# Patient Record
Sex: MALE | Race: WHITE | NOT HISPANIC OR LATINO | URBAN - METROPOLITAN AREA
[De-identification: names, ages, dates, MRNs, and addresses within clinical notes are randomized per-mention and may not be internally consistent; named-entity substitution may affect disease eponyms.]

---

## 2024-01-23 NOTE — PROGRESS NOTES
Chief Complaint  Establish Care    Subjective        Oh Pretty is here to establish care  History of Present Illness  Oh is a 53-year-old male presenting today to establish care. He moved from [place] about a year ago. He is a retired disabled . He would like to get established with a psychiatrist for his anxiety and ADHD. He needs refills on his medications today. He does not take his Adderall daily. He takes it around 3 times per week. He has a history of DDD and arthralgias. He has been doing home exercises for his pain. He would like to see a physical therapist. He also has a toenail fungus on his right and left great toe. It has come and gone over the years. He has been getting pedicures and it helps some. He has several skin tags on his face that he would like to see a dermatologist for.       Previous PCP: Delonte Martinez in [place]  Specialist: none  Marital Status:   Children: 2  Occupation: retired    Exercise: not exercising right now  Diet: eats out frequently   Tobacco use: denies  Alcohol use: denies  Recreational drug use: denies    Routine Health Maintenance:  Dental: 8 months ago  Vision: overdue  Colonoscopy: 2023. In [place]. Unsure about results or follow up  PSA: will do today    Vaccines:  Shingrix: declined  Influenza: declined  COVID: declined  Tdap: 2017      The following portions of the patient's history were reviewed and updated as appropriate: allergies, current medications, past family history, past medical history, past social history, past surgical history and problem list.    Allergies   Allergen Reactions    Poultry Meal Anaphylaxis         Current Outpatient Medications:     amphetamine-dextroamphetamine (ADDERALL) 10 MG tablet, Take 1 tablet by mouth Daily., Disp: , Rfl:     sertraline (ZOLOFT) 50 MG tablet, Take 1 tablet by mouth Daily., Disp: 90 tablet, Rfl: 1    traZODone (DESYREL) 50 MG tablet, Take 1 tablet by mouth Every Night., Disp: 90 tablet,  "Rfl: 1    ciclopirox (Penlac) 8 % solution, Apply  topically to the appropriate area as directed Every Night., Disp: 6 mL, Rfl: 1    EPINEPHrine (EpiPen 2-Valentin) 0.3 MG/0.3ML solution auto-injector injection, Inject 0.3 mL into the appropriate muscle as directed by prescriber 1 (One) Time for 1 dose., Disp: 1 each, Rfl: 0    Family History   Problem Relation Age of Onset    Diabetes type II Father     Diabetes type I Maternal Grandmother     Diabetes type II Maternal Grandfather         No past medical history on file.    Social History     Socioeconomic History    Marital status:    Tobacco Use    Smoking status: Never     Passive exposure: Never    Smokeless tobacco: Never   Vaping Use    Vaping Use: Never used   Substance and Sexual Activity    Alcohol use: Never    Drug use: Never    Sexual activity: Yes        History reviewed. No pertinent surgical history.     Patient Active Problem List   Diagnosis    Anxiety    ADHD    Degenerative arthritis of left knee    DDD (degenerative disc disease), lumbar    Sciatica of left side         There is no immunization history on file for this patient.      Objective   Vital Signs:  /78 (BP Location: Left arm, Patient Position: Sitting, Cuff Size: Large Adult)   Pulse 76   Temp 98.4 °F (36.9 °C) (Temporal)   Ht 180.3 cm (71\")   Wt 107 kg (235 lb)   SpO2 97%   BMI 32.78 kg/m²   Estimated body mass index is 32.78 kg/m² as calculated from the following:    Height as of this encounter: 180.3 cm (71\").    Weight as of this encounter: 107 kg (235 lb).             Review of Systems   Constitutional:  Negative for activity change, appetite change, chills, fatigue, fever and unexpected weight change.   HENT:  Negative for congestion, rhinorrhea, sneezing and sore throat.    Eyes:  Negative for visual disturbance.   Respiratory:  Negative for cough, shortness of breath and wheezing.    Cardiovascular:  Negative for chest pain.   Gastrointestinal:  Negative for " abdominal pain, constipation, diarrhea, nausea and vomiting.   Genitourinary:  Negative for difficulty urinating and dysuria.   Musculoskeletal:  Positive for arthralgias and back pain. Negative for gait problem and myalgias.   Skin:  Negative for color change, rash and wound.   Neurological:  Negative for dizziness, weakness, light-headedness, numbness and headaches.   Psychiatric/Behavioral:  Positive for decreased concentration. Negative for self-injury, sleep disturbance and suicidal ideas. The patient is not nervous/anxious.      Physical Exam  Constitutional:       Appearance: Normal appearance.   HENT:      Head: Normocephalic and atraumatic.      Right Ear: Tympanic membrane, ear canal and external ear normal.      Left Ear: Tympanic membrane, ear canal and external ear normal.      Nose: Nose normal.      Mouth/Throat:      Mouth: Mucous membranes are moist.      Pharynx: Oropharynx is clear.   Eyes:      Extraocular Movements: Extraocular movements intact.      Conjunctiva/sclera: Conjunctivae normal.      Pupils: Pupils are equal, round, and reactive to light.   Cardiovascular:      Rate and Rhythm: Normal rate and regular rhythm.      Pulses: Normal pulses.      Heart sounds: Normal heart sounds.   Pulmonary:      Effort: Pulmonary effort is normal.      Breath sounds: Normal breath sounds.   Abdominal:      General: Abdomen is flat. Bowel sounds are normal.      Palpations: Abdomen is soft.   Musculoskeletal:         General: Normal range of motion.      Cervical back: Normal range of motion and neck supple.   Feet:      Right foot:      Toenail Condition: Fungal disease present.     Left foot:      Toenail Condition: Fungal disease present.  Skin:     General: Skin is warm and dry.      Capillary Refill: Capillary refill takes less than 2 seconds.   Neurological:      Mental Status: He is alert and oriented to person, place, and time.   Psychiatric:         Mood and Affect: Mood normal.          Behavior: Behavior normal.         Thought Content: Thought content normal.         Judgment: Judgment normal.        Result Review :                   Assessment and Plan   Diagnoses and all orders for this visit:    1. Preventative health care (Primary)  Comments:  Overall healthy 53 year old male.  Labs ordered.  F/u 6 months  Orders:  -     Comprehensive Metabolic Panel  -     CBC & Differential    2. Screening for lipid disorders  -     Lipid Panel    3. Screening for thyroid disorder  -     T4, Free  -     TSH    4. Screening for diabetes mellitus  -     Hemoglobin A1c    5. Encounter for vitamin deficiency screening  -     Vitamin D,25-Hydroxy    6. Need for hepatitis C screening test  -     Hepatitis C Antibody    7. Screening PSA (prostate specific antigen)  -     PSA Screen    8. Anxiety  Comments:  Stable.  Cont current meds  Referral to psych  Orders:  -     Ambulatory Referral to Psychiatry  -     sertraline (ZOLOFT) 50 MG tablet; Take 1 tablet by mouth Daily.  Dispense: 90 tablet; Refill: 1  -     traZODone (DESYREL) 50 MG tablet; Take 1 tablet by mouth Every Night.  Dispense: 90 tablet; Refill: 1    9. Attention deficit hyperactivity disorder (ADHD), unspecified ADHD type  Comments:  Stable.  Cont Adderall  Inspect reviewed  Will get UDS  F/u 6 mon  Orders:  -     Ambulatory Referral to Psychiatry    10. Osteoarthritis of left knee, unspecified osteoarthritis type  Comments:  Referral to PT  Cont home exercises  Orders:  -     Ambulatory Referral to Physical Therapy    11. DDD (degenerative disc disease), lumbar  Comments:  Referral to PT  Cont home exercises  Orders:  -     Ambulatory Referral to Physical Therapy    12. Sciatica of left side  Comments:  Referral to PT  Cont home exercises  Orders:  -     Ambulatory Referral to Physical Therapy    13. Skin tag  Comments:  Referral to dermatology  Orders:  -     Ambulatory Referral to Dermatology    14. Onychomycosis  Comments:  Start using  Penlac  call if no improvement  Orders:  -     ciclopirox (Penlac) 8 % solution; Apply  topically to the appropriate area as directed Every Night.  Dispense: 6 mL; Refill: 1    Other orders  -     EPINEPHrine (EpiPen 2-Valentin) 0.3 MG/0.3ML solution auto-injector injection; Inject 0.3 mL into the appropriate muscle as directed by prescriber 1 (One) Time for 1 dose.  Dispense: 1 each; Refill: 0             Follow Up   Return in about 6 months (around 7/24/2024).  Patient was given instructions and counseling regarding his condition or for health maintenance advice. Please see specific information pulled into the AVS if appropriate.

## 2024-01-24 ENCOUNTER — OFFICE VISIT (OUTPATIENT)
Dept: FAMILY MEDICINE CLINIC | Facility: CLINIC | Age: 54
End: 2024-01-24
Payer: OTHER GOVERNMENT

## 2024-01-24 VITALS
BODY MASS INDEX: 32.9 KG/M2 | HEIGHT: 71 IN | SYSTOLIC BLOOD PRESSURE: 115 MMHG | DIASTOLIC BLOOD PRESSURE: 78 MMHG | TEMPERATURE: 98.4 F | WEIGHT: 235 LBS | HEART RATE: 76 BPM | OXYGEN SATURATION: 97 %

## 2024-01-24 DIAGNOSIS — Z00.00 PREVENTATIVE HEALTH CARE: Primary | ICD-10-CM

## 2024-01-24 DIAGNOSIS — M17.12 OSTEOARTHRITIS OF LEFT KNEE, UNSPECIFIED OSTEOARTHRITIS TYPE: ICD-10-CM

## 2024-01-24 DIAGNOSIS — Z13.220 SCREENING FOR LIPID DISORDERS: ICD-10-CM

## 2024-01-24 DIAGNOSIS — Z13.1 SCREENING FOR DIABETES MELLITUS: ICD-10-CM

## 2024-01-24 DIAGNOSIS — Z13.29 SCREENING FOR THYROID DISORDER: ICD-10-CM

## 2024-01-24 DIAGNOSIS — F90.9 ATTENTION DEFICIT HYPERACTIVITY DISORDER (ADHD), UNSPECIFIED ADHD TYPE: ICD-10-CM

## 2024-01-24 DIAGNOSIS — B35.1 ONYCHOMYCOSIS: ICD-10-CM

## 2024-01-24 DIAGNOSIS — Z13.21 ENCOUNTER FOR VITAMIN DEFICIENCY SCREENING: ICD-10-CM

## 2024-01-24 DIAGNOSIS — M51.36 DDD (DEGENERATIVE DISC DISEASE), LUMBAR: ICD-10-CM

## 2024-01-24 DIAGNOSIS — Z11.59 NEED FOR HEPATITIS C SCREENING TEST: ICD-10-CM

## 2024-01-24 DIAGNOSIS — Z12.5 SCREENING PSA (PROSTATE SPECIFIC ANTIGEN): ICD-10-CM

## 2024-01-24 DIAGNOSIS — L91.8 SKIN TAG: ICD-10-CM

## 2024-01-24 DIAGNOSIS — F41.9 ANXIETY: ICD-10-CM

## 2024-01-24 DIAGNOSIS — M54.32 SCIATICA OF LEFT SIDE: ICD-10-CM

## 2024-01-24 PROBLEM — M51.369 DDD (DEGENERATIVE DISC DISEASE), LUMBAR: Status: ACTIVE | Noted: 2024-01-24

## 2024-01-24 RX ORDER — EPINEPHRINE 1 MG/ML
INJECTION, SOLUTION INTRAMUSCULAR; SUBCUTANEOUS
Status: CANCELLED | OUTPATIENT
Start: 2024-01-24

## 2024-01-24 RX ORDER — TRAZODONE HYDROCHLORIDE 50 MG/1
TABLET ORAL
COMMUNITY
Start: 2023-10-20 | End: 2024-01-24 | Stop reason: SDUPTHER

## 2024-01-24 RX ORDER — DEXTROAMPHETAMINE SACCHARATE, AMPHETAMINE ASPARTATE, DEXTROAMPHETAMINE SULFATE AND AMPHETAMINE SULFATE 2.5; 2.5; 2.5; 2.5 MG/1; MG/1; MG/1; MG/1
10 TABLET ORAL DAILY
COMMUNITY

## 2024-01-24 RX ORDER — TRAZODONE HYDROCHLORIDE 50 MG/1
50 TABLET ORAL NIGHTLY
Qty: 90 TABLET | Refills: 1 | Status: SHIPPED | OUTPATIENT
Start: 2024-01-24

## 2024-01-24 RX ORDER — EPINEPHRINE 0.3 MG/.3ML
0.3 INJECTION SUBCUTANEOUS ONCE
Qty: 1 EACH | Refills: 0 | Status: SHIPPED | OUTPATIENT
Start: 2024-01-24 | End: 2024-01-24

## 2024-01-26 ENCOUNTER — LAB (OUTPATIENT)
Dept: FAMILY MEDICINE CLINIC | Facility: CLINIC | Age: 54
End: 2024-01-26
Payer: OTHER GOVERNMENT

## 2024-01-26 LAB
25(OH)D3 SERPL-MCNC: 17.5 NG/ML (ref 30–100)
ALBUMIN SERPL-MCNC: 4.6 G/DL (ref 3.5–5.2)
ALBUMIN/GLOB SERPL: 1.9 G/DL
ALP SERPL-CCNC: 74 U/L (ref 39–117)
ALT SERPL W P-5'-P-CCNC: 37 U/L (ref 1–41)
ANION GAP SERPL CALCULATED.3IONS-SCNC: 9 MMOL/L (ref 5–15)
AST SERPL-CCNC: 23 U/L (ref 1–40)
BASOPHILS # BLD AUTO: 0.05 10*3/MM3 (ref 0–0.2)
BASOPHILS NFR BLD AUTO: 0.6 % (ref 0–1.5)
BILIRUB SERPL-MCNC: 0.6 MG/DL (ref 0–1.2)
BUN SERPL-MCNC: 9 MG/DL (ref 6–20)
BUN/CREAT SERPL: 8.6 (ref 7–25)
CALCIUM SPEC-SCNC: 9.4 MG/DL (ref 8.6–10.5)
CHLORIDE SERPL-SCNC: 104 MMOL/L (ref 98–107)
CHOLEST SERPL-MCNC: 224 MG/DL (ref 0–200)
CO2 SERPL-SCNC: 27 MMOL/L (ref 22–29)
CREAT SERPL-MCNC: 1.05 MG/DL (ref 0.76–1.27)
DEPRECATED RDW RBC AUTO: 42.1 FL (ref 37–54)
EGFRCR SERPLBLD CKD-EPI 2021: 84.4 ML/MIN/1.73
EOSINOPHIL # BLD AUTO: 0.25 10*3/MM3 (ref 0–0.4)
EOSINOPHIL NFR BLD AUTO: 3 % (ref 0.3–6.2)
ERYTHROCYTE [DISTWIDTH] IN BLOOD BY AUTOMATED COUNT: 13 % (ref 12.3–15.4)
GLOBULIN UR ELPH-MCNC: 2.4 GM/DL
GLUCOSE SERPL-MCNC: 86 MG/DL (ref 65–99)
HBA1C MFR BLD: 5.9 % (ref 4.8–5.6)
HCT VFR BLD AUTO: 47.2 % (ref 37.5–51)
HCV AB SER DONR QL: NORMAL
HDLC SERPL-MCNC: 39 MG/DL (ref 40–60)
HGB BLD-MCNC: 16.1 G/DL (ref 13–17.7)
IMM GRANULOCYTES # BLD AUTO: 0.17 10*3/MM3 (ref 0–0.05)
IMM GRANULOCYTES NFR BLD AUTO: 2 % (ref 0–0.5)
LDLC SERPL CALC-MCNC: 138 MG/DL (ref 0–100)
LDLC/HDLC SERPL: 3.42 {RATIO}
LYMPHOCYTES # BLD AUTO: 2.19 10*3/MM3 (ref 0.7–3.1)
LYMPHOCYTES NFR BLD AUTO: 26 % (ref 19.6–45.3)
MCH RBC QN AUTO: 30.7 PG (ref 26.6–33)
MCHC RBC AUTO-ENTMCNC: 34.1 G/DL (ref 31.5–35.7)
MCV RBC AUTO: 89.9 FL (ref 79–97)
MONOCYTES # BLD AUTO: 0.5 10*3/MM3 (ref 0.1–0.9)
MONOCYTES NFR BLD AUTO: 5.9 % (ref 5–12)
NEUTROPHILS NFR BLD AUTO: 5.27 10*3/MM3 (ref 1.7–7)
NEUTROPHILS NFR BLD AUTO: 62.5 % (ref 42.7–76)
NRBC BLD AUTO-RTO: 0 /100 WBC (ref 0–0.2)
PLATELET # BLD AUTO: 245 10*3/MM3 (ref 140–450)
PMV BLD AUTO: 11.3 FL (ref 6–12)
POTASSIUM SERPL-SCNC: 4.3 MMOL/L (ref 3.5–5.2)
PROT SERPL-MCNC: 7 G/DL (ref 6–8.5)
PSA SERPL-MCNC: 1.11 NG/ML (ref 0–4)
RBC # BLD AUTO: 5.25 10*6/MM3 (ref 4.14–5.8)
SODIUM SERPL-SCNC: 140 MMOL/L (ref 136–145)
T4 FREE SERPL-MCNC: 0.98 NG/DL (ref 0.93–1.7)
TRIGL SERPL-MCNC: 259 MG/DL (ref 0–150)
TSH SERPL DL<=0.05 MIU/L-ACNC: 1.57 UIU/ML (ref 0.27–4.2)
VLDLC SERPL-MCNC: 47 MG/DL (ref 5–40)
WBC NRBC COR # BLD AUTO: 8.43 10*3/MM3 (ref 3.4–10.8)

## 2024-01-26 PROCEDURE — 80053 COMPREHEN METABOLIC PANEL: CPT | Performed by: NURSE PRACTITIONER

## 2024-01-26 PROCEDURE — 82306 VITAMIN D 25 HYDROXY: CPT | Performed by: NURSE PRACTITIONER

## 2024-01-26 PROCEDURE — G0103 PSA SCREENING: HCPCS | Performed by: NURSE PRACTITIONER

## 2024-01-26 PROCEDURE — 86803 HEPATITIS C AB TEST: CPT | Performed by: NURSE PRACTITIONER

## 2024-01-26 PROCEDURE — 85025 COMPLETE CBC W/AUTO DIFF WBC: CPT | Performed by: NURSE PRACTITIONER

## 2024-01-26 PROCEDURE — 83036 HEMOGLOBIN GLYCOSYLATED A1C: CPT | Performed by: NURSE PRACTITIONER

## 2024-01-26 PROCEDURE — 36415 COLL VENOUS BLD VENIPUNCTURE: CPT | Performed by: NURSE PRACTITIONER

## 2024-01-26 PROCEDURE — 84443 ASSAY THYROID STIM HORMONE: CPT | Performed by: NURSE PRACTITIONER

## 2024-01-26 PROCEDURE — 80061 LIPID PANEL: CPT | Performed by: NURSE PRACTITIONER

## 2024-01-26 PROCEDURE — 84439 ASSAY OF FREE THYROXINE: CPT | Performed by: NURSE PRACTITIONER

## 2024-02-16 ENCOUNTER — OFFICE VISIT (OUTPATIENT)
Dept: FAMILY MEDICINE CLINIC | Facility: CLINIC | Age: 54
End: 2024-02-16
Payer: OTHER GOVERNMENT

## 2024-02-16 VITALS
SYSTOLIC BLOOD PRESSURE: 126 MMHG | OXYGEN SATURATION: 97 % | TEMPERATURE: 97.5 F | BODY MASS INDEX: 32.48 KG/M2 | HEIGHT: 71 IN | DIASTOLIC BLOOD PRESSURE: 82 MMHG | HEART RATE: 65 BPM | WEIGHT: 232 LBS

## 2024-02-16 DIAGNOSIS — R03.0 ELEVATED BLOOD PRESSURE READING: ICD-10-CM

## 2024-02-16 DIAGNOSIS — H93.11 TINNITUS OF RIGHT EAR: Primary | ICD-10-CM

## 2024-02-16 PROCEDURE — 99213 OFFICE O/P EST LOW 20 MIN: CPT | Performed by: NURSE PRACTITIONER

## 2024-03-19 NOTE — PROGRESS NOTES
[facility]   [address], [address]  [place] [address]  [phone]  Leslie Power, MSN, APRN, PMHNP-BC    NAME: Oh Pretty     : 1970   MRN: [id number]     Patient Care Team:  Odalys Dean APRN as PCP - General (Nurse Practitioner)    DATE: 2024    -Patient was referred by: [] SELF  [x] Alevism provider: JORDYN Campos  -Psychiatric history packet turned in/reviewed : [x] Yes [] No    Subjective     CHIEF COMPLAINT: anxiety, ADHD    HPI:  Oh Pretty, a 53 y.o. male patient seen for the first time today for initial evaluation.    Moved from [place] about a year ago. Retired, disabled [place].  He moved in April of last year, but has been going back and forth to get his medications.   He is on Adderall 10mg. He thinks it was the inattentive form. Diagnosed in the .   He is on sertraline for anxiety and trazodone for sleep.  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 States he becomes anxious when he has to go there. Also develops panic when he is in some social situations. One example he gave was going to [employer]. He reports being on alprazolam in the past for this. He would like to go back on it again.    He has been on the sertraline since .   Never hospitalized for depression or anxiety.   Panic symptoms: anxiety, tingly hands, irritability.   Denies any suicidal thoughts.   He has 2 boys, they are grown. He lives with his wife.   His parents are here. They moved to be near to family.   He is having difficulty sleeping. Takes the trazodone for this. He states it helps for a time, and then stops working. He has to take a couple days off, and then it will work again.    Denies any symptoms of collin.     Family Psychiatric History:   Mother--anxiety     SYMPTOMS:      MOOD: happy     SLEEP: average      ENERGY: average      CONCENTRATION/FOCUS: average      APPETITE: average     PRIOR PSYCH MEDICATIONS:  Sertraline  "  Trazodone   Adderall   Alrazolam   Clonazepam     PRIOR PSYCH DX:   Depression   Anxiety     Medical history:   None      SUBSTANCE USE:   Nicotine/Tobacco: non-smoker  Alcohol: once a month  Illicit drugs: Denies   Cannabis/Marijuana: Denies   Caffeine: he has coffee in the morning.   Vaping: denies    OTC: denies     SEIZURE HISTORY: No    Patient presents with symptoms and behaviors that are consistent with the following DSM-5 diagnoses:  Generalized anxiety disorder  2. Panic disorder  3. ADHD    Ability and capacity to respond to treatment: good  Functional status: good  Prognosis: good  Long term goals: improve anxiety and overall quality of life.   Short term goals:reduce number of panic attacks.  and improve focus and concentration.     Objective     /80   Pulse 67   Ht 180.3 cm (71\")   Wt 106 kg (234 lb 9.6 oz)   SpO2 97%   BMI 32.72 kg/m²   No LMP for male patient.    Social History     Occupational History    Not on file   Tobacco Use    Smoking status: Never     Passive exposure: Never    Smokeless tobacco: Never   Vaping Use    Vaping status: Never Used   Substance and Sexual Activity    Alcohol use: Never    Drug use: Never    Sexual activity: Yes     Family History   Problem Relation Age of Onset    Diabetes type II Father     Diabetes type I Maternal Grandmother     Diabetes type II Maternal Grandfather       History reviewed. No pertinent past medical history.  History reviewed. No pertinent surgical history.   Review of Systems     The following portions of the patient's history were reviewed and updated as appropriate: allergies, current medications, past family history, past medical history, past social history, past surgical history and problem list.      Allergy:   Allergies   Allergen Reactions    Poultry Meal Anaphylaxis        Discontinued Medications:  Medications Discontinued During This Encounter   Medication Reason    amphetamine-dextroamphetamine (ADDERALL) 10 MG tablet " Reorder       Current Medications:   Current Outpatient Medications   Medication Sig Dispense Refill    amphetamine-dextroamphetamine (ADDERALL) 10 MG tablet Take 1 tablet by mouth Daily. 30 tablet 0    ciclopirox (Penlac) 8 % solution Apply  topically to the appropriate area as directed Every Night. 6 mL 1    sertraline (ZOLOFT) 50 MG tablet Take 1 tablet by mouth Daily. 90 tablet 1    traZODone (DESYREL) 50 MG tablet Take 1 tablet by mouth Every Night. 90 tablet 1    ALPRAZolam (XANAX) 0.5 MG tablet Take 1 tablet by mouth As Needed (panic). 15 tablet 1     No current facility-administered medications for this visit.         Psychological ROS: positive for - anxiety and concentration difficulties  negative for - behavioral disorder, decreased libido, disorientation, hallucinations, hostility, irritability, memory difficulties, mood swings, obsessive thoughts, physical abuse, sexual abuse, or suicidal ideation    Mental Status Exam:   Hygiene:   good  Cooperation:  Cooperative  Eye Contact:  Good  Psychomotor Behavior:  Appropriate  Affect:  Appropriate  Mood: anxious  Hopelessness: Denies  Speech:  Normal  Thought Process:  Goal directed and Linear  Thought Content:  Normal and Mood congruent  Suicidal:  None  Homicidal:  None  Hallucinations:  None  Delusion:  None  Memory:  Intact  Orientation:  Person, Place, Time, and Situation  Reliability:  good  Insight:  Good  Judgement:  Good  Impulse Control:  Good  Physical/Medical Issues:  No      PHQ-9 Depression Screening    Little interest or pleasure in doing things? 0-->not at all   Feeling down, depressed, or hopeless? 0-->not at all   Trouble falling or staying asleep, or sleeping too much? 1-->several days   Feeling tired or having little energy? 1-->several days   Poor appetite or overeating? 0-->not at all   Feeling bad about yourself - or that you are a failure or have let yourself or your family down? 0-->not at all   Trouble concentrating on things, such as  reading the newspaper or watching television? 0-->not at all   Moving or speaking so slowly that other people could have noticed? Or the opposite - being so fidgety or restless that you have been moving around a lot more than usual? 0-->not at all   Thoughts that you would be better off dead, or of hurting yourself in some way? 0-->not at all   PHQ-9 Total Score 2   If you checked off any problems, how difficult have these problems made it for you to do your work, take care of things at home, or get along with other people? somewhat difficult        GAD7 Documentation:  Feeling nervous, anxious or on edge 2   Not being able to stop or control worrying 1   Worrying too much about different things 1   Trouble relaxing 2   Being so restless that it is hard to sit still 1   Becoming easily annoyed or irritable 1   Feeling Afraid as if something awful might happen 0   ANETTE Total Score 8   How difficult have these problems made it for you? Somewhat difficult     Never smoker    I advised Oh of the risks of tobacco use.     Result Review:    Labs:  Office Visit on 01/24/2024   Component Date Value Ref Range Status    Glucose 01/26/2024 86  65 - 99 mg/dL Final    BUN 01/26/2024 9  6 - 20 mg/dL Final    Creatinine 01/26/2024 1.05  0.76 - 1.27 mg/dL Final    Sodium 01/26/2024 140  136 - 145 mmol/L Final    Potassium 01/26/2024 4.3  3.5 - 5.2 mmol/L Final    Chloride 01/26/2024 104  98 - 107 mmol/L Final    CO2 01/26/2024 27.0  22.0 - 29.0 mmol/L Final    Calcium 01/26/2024 9.4  8.6 - 10.5 mg/dL Final    Total Protein 01/26/2024 7.0  6.0 - 8.5 g/dL Final    Albumin 01/26/2024 4.6  3.5 - 5.2 g/dL Final    ALT (SGPT) 01/26/2024 37  1 - 41 U/L Final    AST (SGOT) 01/26/2024 23  1 - 40 U/L Final    Alkaline Phosphatase 01/26/2024 74  39 - 117 U/L Final    Total Bilirubin 01/26/2024 0.6  0.0 - 1.2 mg/dL Final    Globulin 01/26/2024 2.4  gm/dL Final    A/G Ratio 01/26/2024 1.9  g/dL Final    BUN/Creatinine Ratio 01/26/2024 8.6  7.0  - 25.0 Final    Anion Gap 01/26/2024 9.0  5.0 - 15.0 mmol/L Final    eGFR 01/26/2024 84.4  >60.0 mL/min/1.73 Final    Hemoglobin A1C 01/26/2024 5.90 (H)  4.80 - 5.60 % Final    Total Cholesterol 01/26/2024 224 (H)  0 - 200 mg/dL Final    Triglycerides 01/26/2024 259 (H)  0 - 150 mg/dL Final    HDL Cholesterol 01/26/2024 39 (L)  40 - 60 mg/dL Final    LDL Cholesterol  01/26/2024 138 (H)  0 - 100 mg/dL Final    VLDL Cholesterol 01/26/2024 47 (H)  5 - 40 mg/dL Final    LDL/HDL Ratio 01/26/2024 3.42   Final    Free T4 01/26/2024 0.98  0.93 - 1.70 ng/dL Final    TSH 01/26/2024 1.570  0.270 - 4.200 uIU/mL Final    25 Hydroxy, Vitamin D 01/26/2024 17.5 (L)  30.0 - 100.0 ng/ml Final    Hepatitis C Ab 01/26/2024 Non-Reactive  Non-Reactive Final    PSA 01/26/2024 1.110  0.000 - 4.000 ng/mL Final    WBC 01/26/2024 8.43  3.40 - 10.80 10*3/mm3 Final    RBC 01/26/2024 5.25  4.14 - 5.80 10*6/mm3 Final    Hemoglobin 01/26/2024 16.1  13.0 - 17.7 g/dL Final    Hematocrit 01/26/2024 47.2  37.5 - 51.0 % Final    MCV 01/26/2024 89.9  79.0 - 97.0 fL Final    MCH 01/26/2024 30.7  26.6 - 33.0 pg Final    MCHC 01/26/2024 34.1  31.5 - 35.7 g/dL Final    RDW 01/26/2024 13.0  12.3 - 15.4 % Final    RDW-SD 01/26/2024 42.1  37.0 - 54.0 fl Final    MPV 01/26/2024 11.3  6.0 - 12.0 fL Final    Platelets 01/26/2024 245  140 - 450 10*3/mm3 Final    Neutrophil % 01/26/2024 62.5  42.7 - 76.0 % Final    Lymphocyte % 01/26/2024 26.0  19.6 - 45.3 % Final    Monocyte % 01/26/2024 5.9  5.0 - 12.0 % Final    Eosinophil % 01/26/2024 3.0  0.3 - 6.2 % Final    Basophil % 01/26/2024 0.6  0.0 - 1.5 % Final    Immature Grans % 01/26/2024 2.0 (H)  0.0 - 0.5 % Final    Neutrophils, Absolute 01/26/2024 5.27  1.70 - 7.00 10*3/mm3 Final    Lymphocytes, Absolute 01/26/2024 2.19  0.70 - 3.10 10*3/mm3 Final    Monocytes, Absolute 01/26/2024 0.50  0.10 - 0.90 10*3/mm3 Final    Eosinophils, Absolute 01/26/2024 0.25  0.00 - 0.40 10*3/mm3 Final    Basophils, Absolute  01/26/2024 0.05  0.00 - 0.20 10*3/mm3 Final    Immature Grans, Absolute 01/26/2024 0.17 (H)  0.00 - 0.05 10*3/mm3 Final    nRBC 01/26/2024 0.0  0.0 - 0.2 /100 WBC Final       Assessment & Plan   Diagnoses and all orders for this visit:    1. ADHD, predominantly inattentive type (Primary)  -     amphetamine-dextroamphetamine (ADDERALL) 10 MG tablet; Take 1 tablet by mouth Daily.  Dispense: 30 tablet; Refill: 0  -     [facility] Full Urine Drug Screen -; Future    2. Generalized anxiety disorder  -     ALPRAZolam (XANAX) 0.5 MG tablet; Take 1 tablet by mouth As Needed (panic).  Dispense: 15 tablet; Refill: 1    3. Panic disorder  -     ALPRAZolam (XANAX) 0.5 MG tablet; Take 1 tablet by mouth As Needed (panic).  Dispense: 15 tablet; Refill: 1  -     [facility] Full Urine Drug Screen -; Future       Continue sertraline 50mg daily.   Continue trazodone 50mg nightly.   Continue Adderall 10mg daily.   Start back on alprazolam 0.5mg PRN panic.     UDS completed.     Visit Diagnoses:    ICD-10-CM ICD-9-CM   1. ADHD, predominantly inattentive type  F90.0 314.00   2. Generalized anxiety disorder  F41.1 300.02   3. Panic disorder  F41.0 300.01       Pt history, review of systems, medications, allergies, reviewed, patient was screened today for depression/anxiety, PHQ/ANETTE scores reviewed.  Most recent vitals/labs reviewed.  Pt was given appropriate time to ask questions and concerns were addressed. A thorough discussion was had that included review of disease process, need for continued monitoring and additional treatment options including use of pharmacological and non-pharmacological approaches to care, decisions were made and agreed upon by patient and provider.   Discussed the risks, benefits, and potential side effects of the medications; patient ackowledged and verbally consented.     TREATMENT PLAN/GOALS: Continue supportive psychotherapy efforts and medications as indicated. Treatment and medication options discussed during  today's visit. Patient ackowledged and verbally consented to continue with current treatment plan and was educated on the importance of compliance with treatment and follow-up appointments.    -Short-Term Goals: Patient will be compliant with medication management and note improvement in S/S over the next 4 to 6 weeks or at next scheduled visit.  -Long-Term Goals: Patient will be compliant with the agreed treatment plan including medication regimen & F/U appt's and deny impairment in daily functioning over the next 6 months.      CRISIS RESOURCES:    In the event you have personal crisis, there are several resources to reach someone to talk with:    988 Suicide and Crisis Lifeline  Call or text 754 or chat 98Junarline.org  [facility]'s Get Me Listed Helpline  7-111-166-HELP (4357)  Text your zip code to 561647 (HELP4U)  's Crisis Line  Dial 200, then press 1  Text 303733    No show policy:  We understand unexpected circumstances arise; however, anytime you miss your appointment we are unable to provide you appropriate care.  In addition, each appointment missed could have been used to provide care for others.  We ask that you call at least 24 hours in advance to cancel or reschedule an appointment. We would like to take this opportunity to remind you of our policy stating patients who miss THREE appointments without cancelling or rescheduling 24 hours in advance of the appointment may be subject to cancellation of any further visits with our clinic. Please call [phone] to reschedule your appointment. If there are reasons that make it difficult for you to keep the appointments, please call and let us know how we can help. Please understand that medication prescribing will not continue without seeing your provider.        MEDICATION ISSUES:  INSPECT reviewed as expected    Discussed medication options and treatment plan of prescribed medication as well as the risks, benefits, and side effects including potential  falls, possible impaired driving and metabolic adversities among others. Patient is agreeable to call the office with any worsening of symptoms or onset of side effects. Patient is agreeable to call 911 or go to the nearest ER should he/she begin having SI/HI. No medication side effects or related complaints today.     MEDS ORDERED DURING VISIT:  New Medications Ordered This Visit   Medications    amphetamine-dextroamphetamine (ADDERALL) 10 MG tablet     Sig: Take 1 tablet by mouth Daily.     Dispense:  30 tablet     Refill:  0    ALPRAZolam (XANAX) 0.5 MG tablet     Sig: Take 1 tablet by mouth As Needed (panic).     Dispense:  15 tablet     Refill:  1     15 tablets for 30 day supply.       Return in about 3 months (around 6/20/2024).         This document has been electronically signed by JORDYN Gavin  March 20, 2024 09:29 EDT    Part of this note may be an electronic transcription/translation of spoken language to printed text using the Dragon Dictation System. Some of the data in this electronic note has been brought forward from a previous encounter, any necessary changes have been made, it has been reviewed by this APRN, and it is accurate.

## 2024-03-20 ENCOUNTER — OFFICE VISIT (OUTPATIENT)
Dept: PSYCHIATRY | Facility: CLINIC | Age: 54
End: 2024-03-20
Payer: OTHER GOVERNMENT

## 2024-03-20 ENCOUNTER — PATIENT ROUNDING (BHMG ONLY) (OUTPATIENT)
Dept: PSYCHIATRY | Facility: CLINIC | Age: 54
End: 2024-03-20
Payer: OTHER GOVERNMENT

## 2024-03-20 VITALS
WEIGHT: 234.6 LBS | HEART RATE: 67 BPM | HEIGHT: 71 IN | DIASTOLIC BLOOD PRESSURE: 80 MMHG | SYSTOLIC BLOOD PRESSURE: 118 MMHG | BODY MASS INDEX: 32.84 KG/M2 | OXYGEN SATURATION: 97 %

## 2024-03-20 DIAGNOSIS — F90.0 ADHD, PREDOMINANTLY INATTENTIVE TYPE: Chronic | ICD-10-CM

## 2024-03-20 DIAGNOSIS — F41.1 GENERALIZED ANXIETY DISORDER: Primary | Chronic | ICD-10-CM

## 2024-03-20 DIAGNOSIS — F41.0 PANIC DISORDER: Chronic | ICD-10-CM

## 2024-03-20 RX ORDER — DEXTROAMPHETAMINE SACCHARATE, AMPHETAMINE ASPARTATE, DEXTROAMPHETAMINE SULFATE AND AMPHETAMINE SULFATE 2.5; 2.5; 2.5; 2.5 MG/1; MG/1; MG/1; MG/1
10 TABLET ORAL DAILY
Qty: 30 TABLET | Refills: 0 | Status: SHIPPED | OUTPATIENT
Start: 2024-03-20

## 2024-03-20 RX ORDER — ALPRAZOLAM 0.5 MG/1
0.5 TABLET ORAL AS NEEDED
Qty: 15 TABLET | Refills: 1 | Status: SHIPPED | OUTPATIENT
Start: 2024-03-20

## 2024-03-20 NOTE — PROGRESS NOTES
A My-chart message has been sent to the patient for PATIENT ROUNDING with [facility].

## 2024-03-29 ENCOUNTER — TELEPHONE (OUTPATIENT)
Dept: PSYCHIATRY | Facility: CLINIC | Age: 54
End: 2024-03-29
Payer: OTHER GOVERNMENT

## 2024-03-29 NOTE — TELEPHONE ENCOUNTER
----- Message from JORDYN Neal sent at 3/29/2024 11:06 AM EDT -----  This patient was positive for alprazolam. He stated this was prescribed to him before, but he was out at the time. I was not able to review controlled substances for him because he stated he had been getting them from [place], so we either need to find out how we can view [place]'s controlled substances, or he is going to get a release for records for where the alprazolam was prescribed. Otherwise, it is considered an illicit drug screen.  ----- Message -----  From: Maia Mishra Incoming  Sent: 3/26/2024   2:54 PM EDT  To: JORDYN Neal

## 2024-03-29 NOTE — TELEPHONE ENCOUNTER
I googled his previous provider in [facility].The info was in media for medical records request. I will call them to see if they will fax me last OV or their last med check (Inspect) to us for continued care.    [address] [address]  [phone]   [phone] fax

## 2024-05-24 DIAGNOSIS — F90.0 ADHD, PREDOMINANTLY INATTENTIVE TYPE: Chronic | ICD-10-CM

## 2024-05-24 DIAGNOSIS — F41.9 ANXIETY: ICD-10-CM

## 2024-05-24 RX ORDER — TRIAMCINOLONE ACETONIDE 55 UG/1
SPRAY, METERED NASAL
COMMUNITY
Start: 2024-05-09

## 2024-05-24 RX ORDER — TRAZODONE HYDROCHLORIDE 50 MG/1
50 TABLET ORAL NIGHTLY
Qty: 90 TABLET | Refills: 1 | Status: SHIPPED | OUTPATIENT
Start: 2024-05-24

## 2024-05-24 RX ORDER — DEXTROAMPHETAMINE SACCHARATE, AMPHETAMINE ASPARTATE, DEXTROAMPHETAMINE SULFATE AND AMPHETAMINE SULFATE 2.5; 2.5; 2.5; 2.5 MG/1; MG/1; MG/1; MG/1
10 TABLET ORAL DAILY
Qty: 30 TABLET | Refills: 0 | Status: SHIPPED | OUTPATIENT
Start: 2024-05-24

## 2024-10-11 ENCOUNTER — TELEPHONE (OUTPATIENT)
Dept: FAMILY MEDICINE CLINIC | Facility: CLINIC | Age: 54
End: 2024-10-11

## 2024-10-11 DIAGNOSIS — F90.0 ADHD, PREDOMINANTLY INATTENTIVE TYPE: Chronic | ICD-10-CM

## 2024-10-11 RX ORDER — DEXTROAMPHETAMINE SACCHARATE, AMPHETAMINE ASPARTATE, DEXTROAMPHETAMINE SULFATE AND AMPHETAMINE SULFATE 2.5; 2.5; 2.5; 2.5 MG/1; MG/1; MG/1; MG/1
10 TABLET ORAL DAILY
Qty: 30 TABLET | Refills: 0 | OUTPATIENT
Start: 2024-10-11

## 2024-10-11 RX ORDER — EPINEPHRINE 0.3 MG/.3ML
0.3 INJECTION SUBCUTANEOUS ONCE
Qty: 1 EACH | Refills: 0 | Status: SHIPPED | OUTPATIENT
Start: 2024-10-11 | End: 2024-10-11

## 2024-10-11 NOTE — TELEPHONE ENCOUNTER
Rx Refill Note  Requested Prescriptions     Pending Prescriptions Disp Refills    amphetamine-dextroamphetamine (ADDERALL) 10 MG tablet 30 tablet 0     Sig: Take 1 tablet by mouth Daily.      Last office visit with prescribing clinician: 3/20/2024   Last telemedicine visit with prescribing clinician: Visit date not found   Next office visit with prescribing clinician: PT TRANSFERRED TO MAKE APPT, BUT THEN OBED SAID HE HUNG UP; I CALLED HIM BACK AND HE DID NOT ANSWER, SO I DON'T KNOW IF HE WILL CALL BACK TO MAKE APPT, BUT HE SAID HE WAS GOING OUT OF TOWN AND WON'T BE BACK UNTIL AFTER NOVEMBER, SO I TOLD HIM TO SCHEDULE THE APPT FOR AFTER NOVEMBER.  Office Visit with Leslie Power APRN (03/20/2024)   [facility] Full Urine Drug Screen - (03/20/2024)                     Would you like a call back once the refill request has been completed: [] Yes [] No    If the office needs to give you a call back, can they leave a voicemail: [] Yes [] No    Humaira Jacome MA  10/11/24, 13:17 EDT

## 2024-10-11 NOTE — TELEPHONE ENCOUNTER
Caller: AMBER GOLDSMITH    Relationship: Spouse    Best call back number:     What medication are you requesting:   EPINEPHrine (EpiPen 2-Valentin) 0.3 MG/0.3ML solution auto-injector injection    What are your current symptoms:     How long have you been experiencing symptoms:     Have you had these symptoms before:    [x] Yes  [] No    Have you been treated for these symptoms before:   [x] Yes  [] No    If a prescription is needed, what is your preferred pharmacy and phone number: [facility] [id number] [address] [phone] [facility] [phone]      Additional notes:

## 2024-11-16 DIAGNOSIS — F41.9 ANXIETY: ICD-10-CM

## 2024-11-18 RX ORDER — TRAZODONE HYDROCHLORIDE 50 MG/1
50 TABLET, FILM COATED ORAL
Qty: 90 TABLET | Refills: 1 | Status: SHIPPED | OUTPATIENT
Start: 2024-11-18

## 2024-12-06 DIAGNOSIS — F90.0 ADHD, PREDOMINANTLY INATTENTIVE TYPE: Chronic | ICD-10-CM

## 2024-12-06 RX ORDER — DEXTROAMPHETAMINE SACCHARATE, AMPHETAMINE ASPARTATE, DEXTROAMPHETAMINE SULFATE AND AMPHETAMINE SULFATE 2.5; 2.5; 2.5; 2.5 MG/1; MG/1; MG/1; MG/1
10 TABLET ORAL DAILY
Qty: 30 TABLET | Refills: 0 | OUTPATIENT
Start: 2024-12-06

## 2024-12-06 NOTE — TELEPHONE ENCOUNTER
Rx Refill Note  Requested Prescriptions     Pending Prescriptions Disp Refills    amphetamine-dextroamphetamine (ADDERALL) 10 MG tablet 30 tablet 0     Sig: Take 1 tablet by mouth Daily.        Last office visit with prescribing clinician: 3/20/2024     Next office visit with prescribing clinician: 2/3/2025     Office Visit with Leslie Power APRN (03/20/2024)     [facility] Full Urine Drug Screen - (03/20/2024)     BEHAVIORAL HEALTH - SCAN - Inspect Report, EDWARD power, 12/6/24 (12/06/2024)     Inspect Fill 5/29/24    Nancy Hisrch  12/06/24, 09:15 EST

## 2024-12-06 NOTE — PROGRESS NOTES
[facility]   [address], [address]  [place] [address]  [phone]  Leslie Power, MSN, APRN, PMHNP-BC    NAME: Oh Pretty     : 1970   MRN: [id number]     Patient Care Team:  Odalys Dean APRN as PCP - General (Nurse Practitioner)    DATE: 2024    -Patient was referred by: [] SELF  [x] Sikhism provider: JORDYN Campos  -Psychiatric history packet turned in/reviewed : [x] Yes [] No    Subjective     CHIEF COMPLAINT: anxiety, ADHD    HPI:  Oh Pretty, a 54 y.o. male patient seen for follow up for psychiatric medication management.     24: Patient here for follow up. He missed his appointment in  due to being in [place]. He states they still have a home there, and his son lives there, and they go back and forth. His son works for the fire department there and they go to visit to help with his kids, etc. He has been taking sertraline and trazodone, and states they are working well. His PCP last refilled them.   Denies any suicidal thoughts.    He reports the Adderall was working well when he was taking it, but he has been out since September. He was waiting for his appointment here.     Will refill Adderall 10mg daily. Patient advised to call if pharmacy is out of stock.     Denies any SI/HI/AVH.     Will plan to follow up in 3 months. Patient reminded that he has to keep his follow up appointments to continue to get refills on controlled medications. He expressed understanding.     3/20/24: Moved from [place] about a year ago. Retired, disabled [place].  He moved in April of last year, but has been going back and forth to get his medications.   He is on Adderall 10mg. He thinks it was the inattentive form. Diagnosed in the .   He is on sertraline for anxiety and trazodone for sleep.  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 States he becomes anxious when he has to go there. Also develops panic when he is in some  social situations. One example he gave was going to [place]. He reports being on alprazolam in the past for this. He would like to go back on it again.    He has been on the sertraline since 2010.   Never hospitalized for depression or anxiety.   Panic symptoms: anxiety, tingly hands, irritability.   Denies any suicidal thoughts.   He has 2 boys, they are grown. He lives with his wife.   His parents are here. They moved to be near to family.   He is having difficulty sleeping. Takes the trazodone for this. He states it helps for a time, and then stops working. He has to take a couple days off, and then it will work again.    Denies any symptoms of collin.     Family Psychiatric History:   Mother--anxiety     PRIOR PSYCH MEDICATIONS:  Sertraline   Trazodone   Adderall   Alrazolam   Clonazepam     PRIOR PSYCH DX:   Depression   Anxiety     Medical history:   None      SUBSTANCE USE:   Nicotine/Tobacco: non-smoker  Alcohol: once a month  Illicit drugs: Denies   Cannabis/Marijuana: Denies   Caffeine: he has coffee in the morning.   Vaping: denies    OTC: denies     SEIZURE HISTORY: No    Patient presents with symptoms and behaviors that are consistent with the following DSM-5 diagnoses:  Generalized anxiety disorder  2. Panic disorder  3. ADHD    Objective     There were no vitals taken for this visit.  No LMP for male patient.    Social History     Occupational History    Not on file   Tobacco Use    Smoking status: Never     Passive exposure: Never    Smokeless tobacco: Never   Vaping Use    Vaping status: Never Used   Substance and Sexual Activity    Alcohol use: Never    Drug use: Never    Sexual activity: Yes     Family History   Problem Relation Age of Onset    Diabetes type II Father     Diabetes type I Maternal Grandmother     Diabetes type II Maternal Grandfather       History reviewed. No pertinent past medical history.  History reviewed. No pertinent surgical history.   Review of Systems     The following  portions of the patient's history were reviewed and updated as appropriate: allergies, current medications, past family history, past medical history, past social history, past surgical history and problem list.      Allergy:   Allergies   Allergen Reactions    Poultry Meal Anaphylaxis        Discontinued Medications:  Medications Discontinued During This Encounter   Medication Reason    amphetamine-dextroamphetamine (ADDERALL) 10 MG tablet Reorder    ALPRAZolam (XANAX) 0.5 MG tablet          Current Medications:   Current Outpatient Medications   Medication Sig Dispense Refill    amphetamine-dextroamphetamine (ADDERALL) 10 MG tablet Take 1 tablet by mouth Daily. 30 tablet 0    ciclopirox (Penlac) 8 % solution Apply  topically to the appropriate area as directed Every Night. 6 mL 1    sertraline (ZOLOFT) 50 MG tablet TAKE 1 TABLET BY MOUTH EVERY DAY 90 tablet 1    traZODone (DESYREL) 50 MG tablet TAKE 1 TABLET BY MOUTH EVERY DAY AT NIGHT 90 tablet 1    Triamcinolone Acetonide (NASACORT) 55 MCG/ACT nasal inhaler 2 SPRAY IN EACH NOSTRIL BY INTRANASAL ROUTE ONCE DAILY FOR 30 DAYS IN THE MORNINGS       No current facility-administered medications for this visit.       MENTAL STATUS EXAM   General Appearance:  Cleanly groomed and dressed  Eye Contact:  Good eye contact  Attitude:  Cooperative  Motor Activity:  Normal gait, posture  Muscle Strength:  Normal  Speech:  Normal rate, tone, volume  Language:  Spontaneous  Mood and affect:  Anxious  Hopelessness:  Denies  Loneliness: Denies  Thought Process:  Logical and goal-directed  Associations/ Thought Content:  No delusions  Hallucinations:  None  Suicidal Ideations:  Not present  Homicidal Ideation:  Not present  Sensorium:  Alert  Orientation:  Person, place and time  Immediate Recall, Recent, and Remote Memory:  Intact  Attention Span/ Concentration:  Good  Fund of Knowledge:  Appropriate for age and educational level  Intellectual Functioning:  Average range  Insight:   Fair  Judgement:  Fair  Reliability:  Fair  Impulse Control:  Fair     PHQ-9 Depression Screening  Little interest or pleasure in doing things? Over half   Feeling down, depressed, or hopeless? Not at all   PHQ-2 Total Score 2   Trouble falling or staying asleep, or sleeping too much? Several days   Feeling tired or having little energy? Several days   Poor appetite or overeating? Not at all   Feeling bad about yourself - or that you are a failure or have let yourself or your family down? Not at all   Trouble concentrating on things, such as reading the newspaper or watching television? Several days   Moving or speaking so slowly that other people could have noticed? Or the opposite - being so fidgety or restless that you have been moving around a lot more than usual? Not at all   Thoughts that you would be better off dead, or of hurting yourself in some way? Not at all   PHQ-9 Total Score 5   If you checked off any problems, how difficult have these problems made it for you to do your work, take care of things at home, or get along with other people? Somewhat difficult           GAD7 Documentation:  Feeling nervous, anxious or on edge 1   Not being able to stop or control worrying 1   Worrying too much about different things 1   Trouble relaxing 1   Being so restless that it is hard to sit still 1   Becoming easily annoyed or irritable 1   Feeling Afraid as if something awful might happen 1   ANETTE Total Score 7   How difficult have these problems made it for you? Somewhat difficult     Never smoker    I advised Oh of the risks of tobacco use.     Result Review:    Labs:  No visits with results within 3 Month(s) from this visit.   Latest known visit with results is:   Office Visit on 01/24/2024   Component Date Value Ref Range Status    Glucose 01/26/2024 86  65 - 99 mg/dL Final    BUN 01/26/2024 9  6 - 20 mg/dL Final    Creatinine 01/26/2024 1.05  0.76 - 1.27 mg/dL Final    Sodium 01/26/2024 140  136 - 145 mmol/L  Final    Potassium 01/26/2024 4.3  3.5 - 5.2 mmol/L Final    Chloride 01/26/2024 104  98 - 107 mmol/L Final    CO2 01/26/2024 27.0  22.0 - 29.0 mmol/L Final    Calcium 01/26/2024 9.4  8.6 - 10.5 mg/dL Final    Total Protein 01/26/2024 7.0  6.0 - 8.5 g/dL Final    Albumin 01/26/2024 4.6  3.5 - 5.2 g/dL Final    ALT (SGPT) 01/26/2024 37  1 - 41 U/L Final    AST (SGOT) 01/26/2024 23  1 - 40 U/L Final    Alkaline Phosphatase 01/26/2024 74  39 - 117 U/L Final    Total Bilirubin 01/26/2024 0.6  0.0 - 1.2 mg/dL Final    Globulin 01/26/2024 2.4  gm/dL Final    A/G Ratio 01/26/2024 1.9  g/dL Final    BUN/Creatinine Ratio 01/26/2024 8.6  7.0 - 25.0 Final    Anion Gap 01/26/2024 9.0  5.0 - 15.0 mmol/L Final    eGFR 01/26/2024 84.4  >60.0 mL/min/1.73 Final    Hemoglobin A1C 01/26/2024 5.90 (H)  4.80 - 5.60 % Final    Total Cholesterol 01/26/2024 224 (H)  0 - 200 mg/dL Final    Triglycerides 01/26/2024 259 (H)  0 - 150 mg/dL Final    HDL Cholesterol 01/26/2024 39 (L)  40 - 60 mg/dL Final    LDL Cholesterol  01/26/2024 138 (H)  0 - 100 mg/dL Final    VLDL Cholesterol 01/26/2024 47 (H)  5 - 40 mg/dL Final    LDL/HDL Ratio 01/26/2024 3.42   Final    Free T4 01/26/2024 0.98  0.93 - 1.70 ng/dL Final    TSH 01/26/2024 1.570  0.270 - 4.200 uIU/mL Final    25 Hydroxy, Vitamin D 01/26/2024 17.5 (L)  30.0 - 100.0 ng/ml Final    Hepatitis C Ab 01/26/2024 Non-Reactive  Non-Reactive Final    PSA 01/26/2024 1.110  0.000 - 4.000 ng/mL Final    WBC 01/26/2024 8.43  3.40 - 10.80 10*3/mm3 Final    RBC 01/26/2024 5.25  4.14 - 5.80 10*6/mm3 Final    Hemoglobin 01/26/2024 16.1  13.0 - 17.7 g/dL Final    Hematocrit 01/26/2024 47.2  37.5 - 51.0 % Final    MCV 01/26/2024 89.9  79.0 - 97.0 fL Final    MCH 01/26/2024 30.7  26.6 - 33.0 pg Final    MCHC 01/26/2024 34.1  31.5 - 35.7 g/dL Final    RDW 01/26/2024 13.0  12.3 - 15.4 % Final    RDW-SD 01/26/2024 42.1  37.0 - 54.0 fl Final    MPV 01/26/2024 11.3  6.0 - 12.0 fL Final    Platelets 01/26/2024 245   140 - 450 10*3/mm3 Final    Neutrophil % 01/26/2024 62.5  42.7 - 76.0 % Final    Lymphocyte % 01/26/2024 26.0  19.6 - 45.3 % Final    Monocyte % 01/26/2024 5.9  5.0 - 12.0 % Final    Eosinophil % 01/26/2024 3.0  0.3 - 6.2 % Final    Basophil % 01/26/2024 0.6  0.0 - 1.5 % Final    Immature Grans % 01/26/2024 2.0 (H)  0.0 - 0.5 % Final    Neutrophils, Absolute 01/26/2024 5.27  1.70 - 7.00 10*3/mm3 Final    Lymphocytes, Absolute 01/26/2024 2.19  0.70 - 3.10 10*3/mm3 Final    Monocytes, Absolute 01/26/2024 0.50  0.10 - 0.90 10*3/mm3 Final    Eosinophils, Absolute 01/26/2024 0.25  0.00 - 0.40 10*3/mm3 Final    Basophils, Absolute 01/26/2024 0.05  0.00 - 0.20 10*3/mm3 Final    Immature Grans, Absolute 01/26/2024 0.17 (H)  0.00 - 0.05 10*3/mm3 Final    nRBC 01/26/2024 0.0  0.0 - 0.2 /100 WBC Final       Assessment & Plan   Diagnoses and all orders for this visit:    1. ADHD, predominantly inattentive type (Primary)  -     amphetamine-dextroamphetamine (ADDERALL) 10 MG tablet; Take 1 tablet by mouth Daily.  Dispense: 30 tablet; Refill: 0    2. Generalized anxiety disorder      Continue sertraline 50mg daily.   Continue trazodone 50mg nightly.   Continue Adderall 10mg daily.     Visit Diagnoses:    ICD-10-CM ICD-9-CM   1. ADHD, predominantly inattentive type  F90.0 314.00   2. Generalized anxiety disorder  F41.1 300.02         Pt history, review of systems, medications, allergies, reviewed, patient was screened today for depression/anxiety, PHQ/ANETTE scores reviewed.  Most recent vitals/labs reviewed.  Pt was given appropriate time to ask questions and concerns were addressed. A thorough discussion was had that included review of disease process, need for continued monitoring and additional treatment options including use of pharmacological and non-pharmacological approaches to care, decisions were made and agreed upon by patient and provider.   Discussed the risks, benefits, and potential side effects of the medications;  patient ackowledged and verbally consented.     TREATMENT PLAN/GOALS: Continue supportive psychotherapy efforts and medications as indicated. Treatment and medication options discussed during today's visit. Patient ackowledged and verbally consented to continue with current treatment plan and was educated on the importance of compliance with treatment and follow-up appointments.    -Short-Term Goals: Patient will be compliant with medication management and note improvement in S/S over the next 4 to 6 weeks or at next scheduled visit.  -Long-Term Goals: Patient will be compliant with the agreed treatment plan including medication regimen & F/U appt's and deny impairment in daily functioning over the next 6 months.      CRISIS RESOURCES:    In the event you have personal crisis, there are several resources to reach someone to talk with:    988 Suicide and Crisis Lifeline  Call or text 986 or chat coconeline.org  [facility]'s National Helpline  7-441-715-HELP (4357)  Text your zip code to 541552 (HELP4U)  's Crisis Line  Dial 188, then press 1  Text 867464    No show policy:  We understand unexpected circumstances arise; however, anytime you miss your appointment we are unable to provide you appropriate care.  In addition, each appointment missed could have been used to provide care for others.  We ask that you call at least 24 hours in advance to cancel or reschedule an appointment. We would like to take this opportunity to remind you of our policy stating patients who miss THREE appointments without cancelling or rescheduling 24 hours in advance of the appointment may be subject to cancellation of any further visits with our clinic. Please call [phone] to reschedule your appointment. If there are reasons that make it difficult for you to keep the appointments, please call and let us know how we can help. Please understand that medication prescribing will not continue without seeing your provider.         MEDICATION ISSUES:  INSPECT reviewed as expected    Discussed medication options and treatment plan of prescribed medication as well as the risks, benefits, and side effects including potential falls, possible impaired driving and metabolic adversities among others. Patient is agreeable to call the office with any worsening of symptoms or onset of side effects. Patient is agreeable to call 911 or go to the nearest ER should he/she begin having SI/HI. No medication side effects or related complaints today.     MEDS ORDERED DURING VISIT:  New Medications Ordered This Visit   Medications    amphetamine-dextroamphetamine (ADDERALL) 10 MG tablet     Sig: Take 1 tablet by mouth Daily.     Dispense:  30 tablet     Refill:  0       Return in about 3 months (around 3/9/2025).         This document has been electronically signed by JORDYN Gavin  December 9, 2024 12:54 EST    Part of this note may be an electronic transcription/translation of spoken language to printed text using the Dragon Dictation System. Some of the data in this electronic note has been brought forward from a previous encounter, any necessary changes have been made, it has been reviewed by this APRN, and it is accurate.

## 2024-12-09 ENCOUNTER — OFFICE VISIT (OUTPATIENT)
Dept: PSYCHIATRY | Facility: CLINIC | Age: 54
End: 2024-12-09
Payer: OTHER GOVERNMENT

## 2024-12-09 DIAGNOSIS — F90.0 ADHD, PREDOMINANTLY INATTENTIVE TYPE: Primary | Chronic | ICD-10-CM

## 2024-12-09 DIAGNOSIS — F41.1 GENERALIZED ANXIETY DISORDER: Chronic | ICD-10-CM

## 2024-12-09 PROCEDURE — 99214 OFFICE O/P EST MOD 30 MIN: CPT

## 2024-12-09 PROCEDURE — 96127 BRIEF EMOTIONAL/BEHAV ASSMT: CPT

## 2024-12-09 RX ORDER — DEXTROAMPHETAMINE SACCHARATE, AMPHETAMINE ASPARTATE, DEXTROAMPHETAMINE SULFATE AND AMPHETAMINE SULFATE 2.5; 2.5; 2.5; 2.5 MG/1; MG/1; MG/1; MG/1
10 TABLET ORAL DAILY
Qty: 30 TABLET | Refills: 0 | Status: SHIPPED | OUTPATIENT
Start: 2024-12-09

## 2025-03-03 DIAGNOSIS — B35.1 ONYCHOMYCOSIS: ICD-10-CM

## 2025-03-03 DIAGNOSIS — F90.0 ADHD, PREDOMINANTLY INATTENTIVE TYPE: Chronic | ICD-10-CM

## 2025-03-03 DIAGNOSIS — F41.9 ANXIETY: ICD-10-CM

## 2025-03-03 RX ORDER — CICLOPIROX 80 MG/ML
SOLUTION TOPICAL NIGHTLY
Qty: 6 ML | Refills: 1 | Status: SHIPPED | OUTPATIENT
Start: 2025-03-03

## 2025-03-03 RX ORDER — TRIAMCINOLONE ACETONIDE 55 UG/1
1 SPRAY, METERED NASAL DAILY
Qty: 16.5 G | Refills: 3 | Status: SHIPPED | OUTPATIENT
Start: 2025-03-03

## 2025-03-03 RX ORDER — TRAZODONE HYDROCHLORIDE 50 MG/1
50 TABLET ORAL
Qty: 90 TABLET | Refills: 1 | Status: SHIPPED | OUTPATIENT
Start: 2025-03-03

## 2025-03-03 RX ORDER — EPINEPHRINE 0.3 MG/.3ML
0.3 INJECTION SUBCUTANEOUS ONCE
Qty: 1 EACH | Refills: 0 | Status: SHIPPED | OUTPATIENT
Start: 2025-03-03 | End: 2025-03-03

## 2025-03-03 RX ORDER — DEXTROAMPHETAMINE SACCHARATE, AMPHETAMINE ASPARTATE, DEXTROAMPHETAMINE SULFATE AND AMPHETAMINE SULFATE 2.5; 2.5; 2.5; 2.5 MG/1; MG/1; MG/1; MG/1
10 TABLET ORAL DAILY
Qty: 30 TABLET | Refills: 0 | Status: SHIPPED | OUTPATIENT
Start: 2025-03-03

## 2025-03-25 ENCOUNTER — HOSPITAL ENCOUNTER (EMERGENCY)
Facility: HOSPITAL | Age: 55
Discharge: HOME | End: 2025-03-25
Attending: EMERGENCY MEDICINE | Admitting: EMERGENCY MEDICINE
Payer: OTHER GOVERNMENT

## 2025-03-25 VITALS
SYSTOLIC BLOOD PRESSURE: 142 MMHG | OXYGEN SATURATION: 97 % | BODY MASS INDEX: 32.87 KG/M2 | HEIGHT: 71 IN | WEIGHT: 234.8 LBS | TEMPERATURE: 97.6 F | RESPIRATION RATE: 18 BRPM | DIASTOLIC BLOOD PRESSURE: 90 MMHG | HEART RATE: 75 BPM

## 2025-03-25 DIAGNOSIS — T78.40XA ALLERGIC REACTION, INITIAL ENCOUNTER: Primary | ICD-10-CM

## 2025-03-25 PROCEDURE — 99283 EMERGENCY DEPT VISIT LOW MDM: CPT

## 2025-03-25 PROCEDURE — 96374 THER/PROPH/DIAG INJ IV PUSH: CPT

## 2025-03-25 PROCEDURE — 25810000003 SODIUM CHLORIDE 0.9 % SOLUTION

## 2025-03-25 PROCEDURE — 96375 TX/PRO/DX INJ NEW DRUG ADDON: CPT

## 2025-03-25 PROCEDURE — 25010000002 DIPHENHYDRAMINE PER 50 MG

## 2025-03-25 PROCEDURE — 99284 EMERGENCY DEPT VISIT MOD MDM: CPT

## 2025-03-25 PROCEDURE — 25010000002 METHYLPREDNISOLONE PER 125 MG

## 2025-03-25 RX ORDER — SODIUM CHLORIDE 0.9 % (FLUSH) 0.9 %
10 SYRINGE (ML) INJECTION AS NEEDED
Status: DISCONTINUED | OUTPATIENT
Start: 2025-03-25 | End: 2025-03-25 | Stop reason: HOSPADM

## 2025-03-25 RX ORDER — DIPHENHYDRAMINE HYDROCHLORIDE 50 MG/ML
25 INJECTION, SOLUTION INTRAMUSCULAR; INTRAVENOUS ONCE
Status: COMPLETED | OUTPATIENT
Start: 2025-03-25 | End: 2025-03-25

## 2025-03-25 RX ORDER — FAMOTIDINE 10 MG/ML
20 INJECTION, SOLUTION INTRAVENOUS ONCE
Status: COMPLETED | OUTPATIENT
Start: 2025-03-25 | End: 2025-03-25

## 2025-03-25 RX ORDER — METHYLPREDNISOLONE SODIUM SUCCINATE 125 MG/2ML
125 INJECTION, POWDER, LYOPHILIZED, FOR SOLUTION INTRAMUSCULAR; INTRAVENOUS ONCE
Status: COMPLETED | OUTPATIENT
Start: 2025-03-25 | End: 2025-03-25

## 2025-03-25 RX ADMIN — DIPHENHYDRAMINE HYDROCHLORIDE 25 MG: 50 INJECTION INTRAMUSCULAR; INTRAVENOUS at 16:27

## 2025-03-25 RX ADMIN — FAMOTIDINE 20 MG: 10 INJECTION INTRAVENOUS at 16:28

## 2025-03-25 RX ADMIN — SODIUM CHLORIDE 1000 ML: 0.9 INJECTION, SOLUTION INTRAVENOUS at 16:31

## 2025-03-25 RX ADMIN — METHYLPREDNISOLONE SODIUM SUCCINATE 125 MG: 125 INJECTION, POWDER, FOR SOLUTION INTRAMUSCULAR; INTRAVENOUS at 16:26

## 2025-03-25 NOTE — FSED PROVIDER NOTE
Subjective   History of Present Illness  54-year-old male reports that he has an allergy to chicken, he reports that he was at a local restaurant and eating a blooming onion and found out that had been cooked in grease that was used to cook chicken.  He reports that he felt a sensation that his throat could be closing and gave himself an injection of an EpiPen.  He reports this took place approximately around 3:15 pm today.  He currently is denying chest pain shortness of breath nausea.  He reports that this has happened 1 time previously but that he was able to be given steroids and did not have any worsening symptoms.  He is denying rash she is denying that he is having any itching.        Review of Systems   All other systems reviewed and are negative.      No past medical history on file.    Allergies   Allergen Reactions    Poultry Meal Anaphylaxis       No past surgical history on file.    Family History   Problem Relation Age of Onset    Diabetes type II Father     Diabetes type I Maternal Grandmother     Diabetes type II Maternal Grandfather        Social History     Socioeconomic History    Marital status:    Tobacco Use    Smoking status: Never     Passive exposure: Never    Smokeless tobacco: Never   Vaping Use    Vaping status: Never Used   Substance and Sexual Activity    Alcohol use: Never    Drug use: Never    Sexual activity: Yes           Objective   Physical Exam  Vitals and nursing note reviewed.   Constitutional:       General: He is not in acute distress.     Appearance: Normal appearance. He is not ill-appearing or toxic-appearing.   HENT:      Head: Normocephalic and atraumatic.      Nose: Nose normal.      Mouth/Throat:      Mouth: Mucous membranes are moist.      Pharynx: Oropharynx is clear.   Eyes:      Extraocular Movements: Extraocular movements intact.      Conjunctiva/sclera: Conjunctivae normal.      Pupils: Pupils are equal, round, and reactive to light.   Cardiovascular:       Rate and Rhythm: Normal rate and regular rhythm.      Pulses: Normal pulses.   Pulmonary:      Effort: Pulmonary effort is normal. No respiratory distress.      Breath sounds: Normal breath sounds. No stridor. No wheezing or rhonchi.   Chest:      Chest wall: No tenderness.   Abdominal:      General: Abdomen is flat. Bowel sounds are normal.      Palpations: Abdomen is soft.   Musculoskeletal:         General: Normal range of motion.      Cervical back: Normal range of motion and neck supple.   Skin:     Capillary Refill: Capillary refill takes less than 2 seconds.   Neurological:      General: No focal deficit present.      Mental Status: He is alert and oriented to person, place, and time. Mental status is at baseline.         Procedures           ED Course                                           Medical Decision Making  Patient is not in any acute respiratory distress.  His O2 sats are 97% on room air.  It is unclear if he actually received the full amount of epinephrine through the EpiPen.  He is not tachycardic his heart rate is 75.  Will go ahead and order peripheral IV along with 125 Solu-Medrol 20 mg Pepcid along with 25 mg Benadryl.    Patient has passed a p.o. fluid challenge.  He reports that he feels like his throat is no longer swollen.  He is requesting discharge.    No acute respiratory distress.  No signs of angioedema.    Problems Addressed:  Allergic reaction, initial encounter: complicated acute illness or injury    Risk  Prescription drug management.        Final diagnoses:   Allergic reaction, initial encounter       ED Disposition  ED Disposition       ED Disposition   Discharge    Condition   Stable    Comment   --               Odalys Dean, APRN  [address]  [place] [address]  [phone]               Medication List      No changes were made to your prescriptions during this visit.

## 2025-03-25 NOTE — DISCHARGE INSTRUCTIONS
Resume home medications    Recommend that you talk to your family doctor about this ER visit    For worsening symptoms return to ER